# Patient Record
Sex: FEMALE | Race: WHITE | URBAN - METROPOLITAN AREA
[De-identification: names, ages, dates, MRNs, and addresses within clinical notes are randomized per-mention and may not be internally consistent; named-entity substitution may affect disease eponyms.]

---

## 2017-03-02 ENCOUNTER — TELEPHONE (OUTPATIENT)
Dept: FAMILY MEDICINE CLINIC | Age: 22
End: 2017-03-02

## 2017-03-03 ENCOUNTER — TELEPHONE (OUTPATIENT)
Dept: FAMILY MEDICINE CLINIC | Age: 22
End: 2017-03-03

## 2017-03-03 RX ORDER — ECHINACEA PURPUREA EXTRACT 125 MG
1 TABLET ORAL PRN
Qty: 2 BOTTLE | Refills: 3 | Status: SHIPPED | OUTPATIENT
Start: 2017-03-03

## 2017-03-03 RX ORDER — ECHINACEA PURPUREA EXTRACT 125 MG
1 TABLET ORAL PRN
Qty: 1 BOTTLE | Refills: 3 | Status: SHIPPED | OUTPATIENT
Start: 2017-03-03 | End: 2017-03-03 | Stop reason: SDUPTHER

## 2017-03-06 ENCOUNTER — TELEPHONE (OUTPATIENT)
Dept: FAMILY MEDICINE CLINIC | Age: 22
End: 2017-03-06

## 2017-03-24 ENCOUNTER — OFFICE VISIT (OUTPATIENT)
Dept: FAMILY MEDICINE CLINIC | Age: 22
End: 2017-03-24

## 2017-03-24 VITALS — BODY MASS INDEX: 22.3 KG/M2 | WEIGHT: 118 LBS

## 2017-03-24 DIAGNOSIS — J06.9 ACUTE URI: ICD-10-CM

## 2017-03-24 DIAGNOSIS — J06.9 ACUTE URI: Primary | ICD-10-CM

## 2017-03-24 PROCEDURE — 99213 OFFICE O/P EST LOW 20 MIN: CPT | Performed by: FAMILY MEDICINE

## 2017-03-24 RX ORDER — OSELTAMIVIR PHOSPHATE 75 MG/1
75 CAPSULE ORAL 2 TIMES DAILY
Qty: 10 CAPSULE | Refills: 0 | Status: SHIPPED | OUTPATIENT
Start: 2017-03-24 | End: 2017-03-24 | Stop reason: ALTCHOICE

## 2017-03-24 RX ORDER — METYROSINE 250 MG/1
CAPSULE ORAL
COMMUNITY
Start: 2017-03-10

## 2017-03-24 RX ORDER — OSELTAMIVIR PHOSPHATE 6 MG/ML
75 FOR SUSPENSION ORAL 2 TIMES DAILY
Qty: 125 ML | Refills: 0 | Status: SHIPPED | OUTPATIENT
Start: 2017-03-24 | End: 2017-03-29

## 2017-03-24 RX ORDER — AZITHROMYCIN 250 MG/1
TABLET, FILM COATED ORAL
Qty: 1 PACKET | Refills: 0 | Status: SHIPPED | OUTPATIENT
Start: 2017-03-24 | End: 2017-04-03

## 2017-04-04 ENCOUNTER — OFFICE VISIT (OUTPATIENT)
Dept: FAMILY MEDICINE CLINIC | Age: 22
End: 2017-04-04

## 2017-04-04 DIAGNOSIS — J06.9 ACUTE URI: Primary | ICD-10-CM

## 2017-04-04 PROCEDURE — 99213 OFFICE O/P EST LOW 20 MIN: CPT | Performed by: FAMILY MEDICINE

## 2017-04-27 ENCOUNTER — OFFICE VISIT (OUTPATIENT)
Dept: FAMILY MEDICINE CLINIC | Age: 22
End: 2017-04-27

## 2017-04-27 VITALS — BODY MASS INDEX: 21.24 KG/M2 | WEIGHT: 112.4 LBS

## 2017-04-27 DIAGNOSIS — L30.9 DERMATITIS: Primary | ICD-10-CM

## 2017-04-27 PROCEDURE — 99212 OFFICE O/P EST SF 10 MIN: CPT | Performed by: FAMILY MEDICINE

## 2017-04-27 RX ORDER — DIAPER,BRIEF,INFANT-TODD,DISP
EACH MISCELLANEOUS
Qty: 1 TUBE | Refills: 1 | Status: SHIPPED | OUTPATIENT
Start: 2017-04-27 | End: 2017-05-04

## 2017-05-04 ENCOUNTER — TELEPHONE (OUTPATIENT)
Dept: FAMILY MEDICINE CLINIC | Age: 22
End: 2017-05-04

## 2017-05-04 RX ORDER — DIPHENHYDRAMINE HCL 12.5MG/5ML
12.5 LIQUID (ML) ORAL 4 TIMES DAILY PRN
Qty: 120 ML | Refills: 4 | Status: SHIPPED | OUTPATIENT
Start: 2017-05-04

## 2017-11-02 ENCOUNTER — TELEPHONE (OUTPATIENT)
Dept: FAMILY MEDICINE CLINIC | Age: 22
End: 2017-11-02

## 2017-11-02 NOTE — TELEPHONE ENCOUNTER
Pt mom states [facility] was going to call us to give us the culture results? Mom read to me what sounded like UA results stated the culture probably is not back yet. She seemed a little frustrated asked If we could call [facility] and get the facts from them.  I will call and see if a culture is done or ordered   Pt [phone]

## 2017-11-02 NOTE — TELEPHONE ENCOUNTER
Pt was in [facility] on [address]. She had a fever and they ran a urine test.  Her mother is trying to find out whether or not her daughter has a UTI. She says she looked on-line but could not interpret the result.     Last office visit 4/27/17

## 2017-11-02 NOTE — TELEPHONE ENCOUNTER
Lisa mom called again she is concerned because pt's temp is back 104 she would like to talk to Wolfgang Shields

## 2017-11-02 NOTE — TELEPHONE ENCOUNTER
Spoke with the Mom, now the father is taking Lisa back to the ER, she had 2 ER visit already   We should have offered an appointment for today, already made one for tomorrow

## 2017-11-03 ENCOUNTER — OFFICE VISIT (OUTPATIENT)
Dept: FAMILY MEDICINE CLINIC | Age: 22
End: 2017-11-03

## 2017-11-03 ENCOUNTER — TELEPHONE (OUTPATIENT)
Dept: FAMILY MEDICINE CLINIC | Age: 22
End: 2017-11-03

## 2017-11-03 VITALS — WEIGHT: 104 LBS | BODY MASS INDEX: 19.65 KG/M2

## 2017-11-03 DIAGNOSIS — R68.89 FEVER AND OTHER PHYSIOLOGIC DISTURBANCES OF TEMPERATURE REGULATION: ICD-10-CM

## 2017-11-03 DIAGNOSIS — R50.9 FEVER, UNSPECIFIED FEVER CAUSE: ICD-10-CM

## 2017-11-03 DIAGNOSIS — B34.9 VIRAL INFECTION: Primary | ICD-10-CM

## 2017-11-03 LAB
BILIRUBIN URINE: ABNORMAL
BLOOD, URINE: ABNORMAL
CLARITY: ABNORMAL
COLOR: YELLOW
GLUCOSE URINE: ABNORMAL
KETONES, URINE: ABNORMAL
LEUKOCYTE ESTERASE, URINE: ABNORMAL
NITRITE, URINE: ABNORMAL
PH UA: 5.5
PROTEIN UA: ABNORMAL
SPECIFIC GRAVITY UA: 1.01
UROBILINOGEN, URINE: 1 MG/DL

## 2017-11-03 PROCEDURE — 99213 OFFICE O/P EST LOW 20 MIN: CPT | Performed by: FAMILY MEDICINE

## 2017-11-03 PROCEDURE — 81003 URINALYSIS AUTO W/O SCOPE: CPT | Performed by: FAMILY MEDICINE

## 2017-11-03 NOTE — PROGRESS NOTES
Subjective:       Maile Tavera is a 23 y.o. female who presents for evaluation of fever. She has had the fever for 3 days. Symptoms have been gradually improving. Symptoms are described as fevers up to 104 degrees, and are worse in the afternoon. Associated symptoms are Patient is autistic gets very hard to get any history all the information from her father and caregiver. Patient denies And able to assess. She has tried to alleviate the symptoms with acetaminophen and ibuprofen with moderate relief. The patient Is autistic, lives at a group home, parents are involved. So they were cold with a high temperature was taken to the emergency room at [facility] on the first UA was then was abnormal supposed to send a urine culture we don't have a clear result on it, at that time the preferred to do a catheter the when necessary did not see any need for that so they didn't just the bag so they were concerned about possible contamination they were waiting for the culture yesterday she ran another high fever 104 so father picked her up from the home take her to the emergency room at [facility] they waited more than 2 hours at that time have her temperature broke after she was given now ibuprofen subjective the could not wait longer they went back home, today there is no fever felt warm, her intake is improving she has kept things down no vomiting or diarrhea her blood work at [facility] was okay very slight increase in WBC. Past Medical History:   Diagnosis Date    Autism     Epilepsy [facility])     see Neurology at [facility]     Marquez-Parkinson-White syndrome      Patient Active Problem List    Diagnosis Date Noted    PDD (pervasive developmental disorder) 05/23/2014    Epilepsy ([facility] [address] 11/01/2013    Autism 11/01/2013    Kishan-Parkinson-White syndrome 11/01/2013     No past surgical history on file.   Family History   Problem Relation Age of Onset    Diabetes Father     High Blood Pressure Father     High Cholesterol Father      Social History     Social History    Marital status: Single     Spouse name: N/A    Number of children: N/A    Years of education: N/A     Social History Main Topics    Smoking status: Never Smoker    Smokeless tobacco: Never Used    Alcohol use Not on file    Drug use: Unknown    Sexual activity: Not on file     Other Topics Concern    Not on file     Social History Narrative    No narrative on file     Current Outpatient Prescriptions   Medication Sig Dispense Refill    diphenhydrAMINE (BENADRYL) 12.5 MG/5ML elixir Take 5 mLs by mouth 4 times daily as needed for Allergies 120 mL 4    cetirizine (ZYRTEC) 1 MG/ML syrup Take 10 mLs by mouth daily as needed (allergy symptoms, rash) 60 mL 2    metyrosine (DEMSER) 250 MG capsule 250mg three times daily.  mineral oil-hydrophilic petrolatum (AQUAPHOR) ointment Apply topically as needed. 2 Tube 2    sodium chloride (OCEAN) 0.65 % nasal spray 1 spray by Nasal route as needed for Congestion 2 Bottle 3    fluticasone (FLONASE) 50 MCG/ACT nasal spray 2 sprays by Nasal route daily 1 Bottle 3    naltrexone (DEPADE) 50 MG tablet Take 25 mg by mouth daily      Selenium 200 MCG CAPS Take by mouth      Zinc 25 MG TABS Take by mouth      glycopyrrolate (ROBINUL) 1 MG tablet Take 1 mg by mouth 3 times daily      Ascorbic Acid (VITAMIN C) 500 MG tablet Take 500 mg by mouth daily      melatonin 3 MG TABS tablet Take 3 mg by mouth daily      polyethylene glycol (GLYCOLAX) powder Take 17 g by mouth daily.  divalproex (DEPAKOTE SPRINKLES) 125 MG capsule TAKE 4 CAPSULES EVERY MORNING AND 4 CAPS IN AFTERNOON AND  5 CAPS EVERY EVENING      fluconazole (DIFLUCAN) 100 MG tablet Take 100 mg by mouth daily.       folic acid (FOLVITE) 1 MG tablet TAKE 1 MG EVERY MORNING AND 2 MG EVERY EVENING      ibuprofen (ADVIL;MOTRIN) 200 MG tablet TAKE 400MG BY MOUTH EVERY 6-8 HOURS AS NEEDED      levOCARNitine (CARNITOR) 1 GM/10ML

## 2017-11-04 NOTE — TELEPHONE ENCOUNTER
Patient's father called and wondering if patient should be on an antibiotic based on her urinalysis result that came back today. It was negative for nitrite and leukocyte esterase. Patient has been afebrile since yesterday (reports patient has gone to the ER three times this week for fevers). She is stable and not doing any worse then she has been all week- fever is better. I told the father that based on the UA alone I would not treat with an antibiotic given that the nitrite and leukocyte esterase were negative. However that did not means she may need evaluation for something else going on. I told him to f/u with PCP next week to determine the next step for the patient if she remains stable or to go back to the ER over the weekend if patient doing any worse or father was concerned.

## 2017-11-05 LAB — URINE CULTURE, ROUTINE: NORMAL

## 2018-01-24 ENCOUNTER — OFFICE VISIT (OUTPATIENT)
Dept: FAMILY MEDICINE CLINIC | Age: 23
End: 2018-01-24

## 2018-01-24 VITALS
BODY MASS INDEX: 19.16 KG/M2 | WEIGHT: 101.4 LBS | TEMPERATURE: 97.5 F | DIASTOLIC BLOOD PRESSURE: 60 MMHG | SYSTOLIC BLOOD PRESSURE: 90 MMHG

## 2018-01-24 DIAGNOSIS — J02.9 SORE THROAT: Primary | ICD-10-CM

## 2018-01-24 DIAGNOSIS — J11.1 FLU: ICD-10-CM

## 2018-01-24 LAB — S PYO AG THROAT QL: NORMAL

## 2018-01-24 PROCEDURE — 87880 STREP A ASSAY W/OPTIC: CPT | Performed by: FAMILY MEDICINE

## 2018-01-24 PROCEDURE — 99213 OFFICE O/P EST LOW 20 MIN: CPT | Performed by: FAMILY MEDICINE

## 2018-01-24 RX ORDER — MULTIVIT-MIN/IRON/FOLIC ACID/K 18-600-40
CAPSULE ORAL 2 TIMES DAILY
COMMUNITY

## 2018-01-24 RX ORDER — METFORMIN HYDROCHLORIDE 750 MG/1
750 TABLET, EXTENDED RELEASE ORAL
COMMUNITY
End: 2018-01-24

## 2018-01-24 RX ORDER — OSELTAMIVIR PHOSPHATE 75 MG/1
75 CAPSULE ORAL 2 TIMES DAILY
Qty: 10 CAPSULE | Refills: 0 | Status: SHIPPED | OUTPATIENT
Start: 2018-01-24 | End: 2018-01-29

## 2018-01-24 ASSESSMENT — ENCOUNTER SYMPTOMS
SHORTNESS OF BREATH: 0
CONSTIPATION: 0
SINUS PAIN: 0
FACIAL SWELLING: 0
SINUS PRESSURE: 0
ALLERGIC/IMMUNOLOGIC NEGATIVE: 1
TROUBLE SWALLOWING: 0
RHINORRHEA: 0
EYES NEGATIVE: 1
ABDOMINAL PAIN: 0

## 2018-10-23 ENCOUNTER — NURSE ONLY (OUTPATIENT)
Dept: FAMILY MEDICINE CLINIC | Age: 23
End: 2018-10-23
Payer: MEDICAID

## 2018-10-23 DIAGNOSIS — Z23 NEED FOR INFLUENZA VACCINATION: Primary | ICD-10-CM

## 2018-10-23 PROCEDURE — 90471 IMMUNIZATION ADMIN: CPT | Performed by: FAMILY MEDICINE

## 2018-10-23 PROCEDURE — 90686 IIV4 VACC NO PRSV 0.5 ML IM: CPT | Performed by: FAMILY MEDICINE

## 2018-10-23 NOTE — PATIENT INSTRUCTIONS
virus vaccine is also available in a nasal spray form, which is a \"live virus\" vaccine. Influenza virus vaccine works by exposing you to a small dose of the virus, which helps your body to develop immunity to the disease. Influenza virus vaccine will not treat an active infection that has already developed in the body. Influenza virus vaccine is for use in adults and children who are at least 7 months old. Becoming infected with influenza is much more dangerous to your health than receiving this vaccine. Influenza causes thousands of deaths each year, and hundreds of thousands of hospitalizations. However, like any medicine, this vaccine can cause side effects but the risk of serious side effects is extremely low. Like any vaccine, influenza virus vaccine may not provide protection from disease in every person. This vaccine will not prevent illness caused by bryson flu (\"bird flu\"). What should I discuss with my healthcare provider before receiving this vaccine? You may not be able to receive this vaccine if you are allergic to eggs, or if you have:  · a history of severe allergic reaction to a flu vaccine; or  · a history of Guillain-Evans syndrome (within 6 weeks after receiving a flu vaccine). To make sure this vaccine is safe for you, tell your doctor if you have ever had:  · a bleeding or blood clotting disorder such as hemophilia or easy bruising;  · a neurologic disorder or disease affecting the brain (or if this was a reaction to a previous vaccine);  · seizures;  · a weak immune system caused by disease, bone marrow transplant, or by using certain medicines or receiving cancer treatments; or  · if you are allergic to latex rubber. You can still receive a vaccine if you have a minor cold. If you have a severe illness with a fever or any type of infection, wait until you get better before receiving this vaccine.   The Centers for Disease Control and Prevention recommends that pregnant women get a flu

## 2020-10-07 ENCOUNTER — TELEPHONE (OUTPATIENT)
Dept: PRIMARY CARE CLINIC | Age: 25
End: 2020-10-07

## 2020-10-07 NOTE — TELEPHONE ENCOUNTER
----- Message from George Currie sent at 10/7/2020  4:38 PM EDT -----  Subject: Referral Request    QUESTIONS   Reason for referral request? in need of an orthopedic referral was   speaking with Casey Cosme in the office. Father has seen Dr. Dev Guerrero and   liked him. Has the physician seen you for this condition before? No   Preferred Specialist (if applicable)? Do you already have an appointment scheduled? No  Additional Information for Provider? Daughter has bump about 3 inches   below her knee on right leg not going away. It is about 3 in wide 2 in   high   looks like a 2nd knee. They have no idea how it has happened   has been there about a month. The  would not give an appt without a   referral   ---------------------------------------------------------------------------  --------------  CALL BACK INFO  What is the best way for the office to contact you? OK to leave message on   voicemail  Preferred Call Back Phone Number?  [phone]